# Patient Record
Sex: FEMALE | Race: WHITE | NOT HISPANIC OR LATINO | Employment: STUDENT | URBAN - METROPOLITAN AREA
[De-identification: names, ages, dates, MRNs, and addresses within clinical notes are randomized per-mention and may not be internally consistent; named-entity substitution may affect disease eponyms.]

---
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
## 2025-01-06 ENCOUNTER — MYC MEDICAL ADVICE (OUTPATIENT)
Dept: PEDIATRIC CARDIOLOGY | Facility: CLINIC | Age: 20
End: 2025-01-06
Payer: COMMERCIAL

## 2025-01-06 DIAGNOSIS — I47.10 SUPRAVENTRICULAR TACHYCARDIA: Primary | ICD-10-CM

## 2025-01-07 ENCOUNTER — ORDERS ONLY (AUTO-RELEASED) (OUTPATIENT)
Dept: PEDIATRIC CARDIOLOGY | Facility: CLINIC | Age: 20
End: 2025-01-07
Payer: COMMERCIAL

## 2025-01-07 DIAGNOSIS — I47.10 SUPRAVENTRICULAR TACHYCARDIA: ICD-10-CM

## 2025-01-30 ENCOUNTER — TELEPHONE (OUTPATIENT)
Dept: PEDIATRIC CARDIOLOGY | Facility: CLINIC | Age: 20
End: 2025-01-30
Payer: COMMERCIAL

## 2025-01-30 LAB — CV ZIO PRELIM RESULTS: NORMAL

## 2025-01-30 NOTE — TELEPHONE ENCOUNTER
Incoming call from [employer] to report abnormal Zio results:    Ref: [id number]    Call in regard to 11 day monitor dated from Jan 11-23rd    Finding for VTACH that occurred on Jan 21st at 7:15 am at a rate of 172 BPM, for 1.8 seconds.     Event on page 14, strip 8. Report to be uploaded in 20-30 minutes.     Will send high priority notification to provider per protocol.     Zully Hussein RN on 1/30/2025 at 2:12 PM

## 2025-05-08 DIAGNOSIS — I47.10 SUPRAVENTRICULAR TACHYCARDIA: Primary | ICD-10-CM

## 2025-05-14 ENCOUNTER — ANCILLARY PROCEDURE (OUTPATIENT)
Dept: CARDIOLOGY | Facility: CLINIC | Age: 20
End: 2025-05-14
Payer: COMMERCIAL

## 2025-05-14 ENCOUNTER — OFFICE VISIT (OUTPATIENT)
Dept: PEDIATRIC CARDIOLOGY | Facility: CLINIC | Age: 20
End: 2025-05-14
Payer: COMMERCIAL

## 2025-05-14 VITALS — SYSTOLIC BLOOD PRESSURE: 115 MMHG | DIASTOLIC BLOOD PRESSURE: 72 MMHG | HEART RATE: 67 BPM

## 2025-05-14 DIAGNOSIS — R06.09 DOE (DYSPNEA ON EXERTION): Primary | ICD-10-CM

## 2025-05-14 DIAGNOSIS — I47.10 SUPRAVENTRICULAR TACHYCARDIA: ICD-10-CM

## 2025-05-14 LAB
ATRIAL RATE - MUSE: 48 BPM
DIASTOLIC BLOOD PRESSURE - MUSE: NORMAL MMHG
INTERPRETATION ECG - MUSE: NORMAL
P AXIS - MUSE: 29 DEGREES
PR INTERVAL - MUSE: 126 MS
QRS DURATION - MUSE: 90 MS
QT - MUSE: 464 MS
QTC - MUSE: 414 MS
R AXIS - MUSE: 82 DEGREES
SYSTOLIC BLOOD PRESSURE - MUSE: NORMAL MMHG
T AXIS - MUSE: 45 DEGREES
VENTRICULAR RATE- MUSE: 48 BPM

## 2025-05-14 PROCEDURE — 3078F DIAST BP <80 MM HG: CPT | Performed by: PEDIATRICS

## 2025-05-14 PROCEDURE — 1126F AMNT PAIN NOTED NONE PRSNT: CPT | Performed by: PEDIATRICS

## 2025-05-14 PROCEDURE — 93306 TTE W/DOPPLER COMPLETE: CPT | Performed by: PEDIATRICS

## 2025-05-14 PROCEDURE — 3074F SYST BP LT 130 MM HG: CPT | Performed by: PEDIATRICS

## 2025-05-14 PROCEDURE — 99214 OFFICE O/P EST MOD 30 MIN: CPT | Mod: 25 | Performed by: PEDIATRICS

## 2025-05-14 RX ORDER — ALBUTEROL SULFATE 90 UG/1
2 INHALANT RESPIRATORY (INHALATION) EVERY 6 HOURS PRN
Qty: 18 G | Refills: 0 | Status: SHIPPED | OUTPATIENT
Start: 2025-05-14

## 2025-05-14 ASSESSMENT — PAIN SCALES - GENERAL: PAINLEVEL_OUTOF10: NO PAIN (0)

## 2025-05-14 NOTE — PATIENT INSTRUCTIONS
[facility]   Pediatric Specialty Clinic [place]      Pediatric Call Center Scheduling and Nurse Questions:  [phone]    After hours urgent matters that cannot wait until the next business day:  [phone].  Ask for the on-call pediatric doctor for the specialty you are calling for be paged.      Prescription Renewals:  Please call your pharmacy first.  Your pharmacy must fax requests to [phone].  Please allow 2-3 days for prescriptions to be authorized.    If your physician has ordered a CT or MRI, you may schedule this test by calling [facility] in [place] at [phone].        **If your child is having a sedated procedure, they will need a history and physical done at their Primary Care Provider within 30 days of the procedure.  If your child was seen by the ordering provider in our office within 30 days of the procedure, their visit summary will work for the H&P unless they inform you otherwise.  If you have any questions, please call the RN Care Coordinator.**

## 2025-05-14 NOTE — LETTER
5/14/2025      RE: Ksenia Clark  [address]  [facility] [address]     Dear Colleague,    Thank you for the opportunity to participate in the care of your patient, Ksenia Clark, at the [facility] P[facility]ATR[facility] CLIN[facility]. Please see a copy of my visit note below.    No notes on file    Please do not hesitate to contact me if you have any questions/concerns.     Sincerely,       Gerry Titus MD   HPI.    Physical Examination:  /72 (BP Location: Right arm, Patient Position: Sitting, Cuff Size: Adult Regular)   Pulse 67   GENERAL: Pleasant and conversant, non-distressed  SKIN: Clear, no rash or abnormal pigmentation  HEAD: NC/AT, nondysmorphic  NECK: Supple without lymphadenopathy or thyromegaly  LUNGS: CTAB, normal symmetric air entry, normal WOB, no rales/rhonchi/wheezes  HEART: Quiet precordium, RRR, normal S1/S2, no murmurs, no r/g  ABDOMEN: Soft, NT/ND, normoactive BS, no HSM  EXTREMITIES: W/WP, no c/c/e, pulses 2+ throughout without radio-femoral delay  GENITOURINARY: deferred        I reviewed and interpreted Ksenia's ECG from today, which showed normal sinus rhythm, normal axes and intervals, no preexcitation, normal ST-T waves, and normal voltages.   I reviewed her echo from today, which showed normal structure and function.    Assessment and Plan: Ksenia is a 20 year old female with supraventricular tachycardia status-post empiric modification of dual AV node physiology during EP study, with recurrence <1-year. Both episodes were easily broken with vagal maneuvers. We discussed options moving forward today, including ongoing monitoring without other intervention (given her episodes are easily broken and infrequent); daily preventative medication such as beta blockade; pill-in-the-pocket strategy (of little utility given that episodes end spontaneously before the medication would even begin to have an effect); or repeat EP study, either with Dr. Alamo or with a n adult electrophysiologist. She will consider options and get back to me. She will follow-up in 6months regardless with an ECG. She has no activity restrictions. No antibiotic prophylaxis required for invasive procedures..    Thank you for the opportunity to follow Ksenia with you. Please don't hesitate to contact me with questions or concerns.    Gerry Titus MD  Pediatric Cardiology  [facility]  [address] [address]  Phone [phone]  Fax [phone]    I spent a total of 30 minutes reviewing records and results, obtaining direct clinical information, counseling, and coordinating care for Ksenia Clark during today's office visit.       Please do not hesitate to contact me if you have any questions/concerns.     Sincerely,       Gerry Titus MD

## 2025-05-14 NOTE — NURSING NOTE
"[facility] [[id number]]  Chief Complaint   Patient presents with    Follow Up     Paroxysmal supraventricular tachycardia     Initial /72 (BP Location: Right arm, Patient Position: Sitting, Cuff Size: Adult Regular)   Pulse 67  Estimated body mass index is 26.11 kg/m  as calculated from the following:    Height as of 7/26/24: 5' 3.82\" (162.1 cm).    Weight as of 7/26/24: 151 lb 3.8 oz (68.6 kg).  Medication Reconciliation: complete    Does the patient need any medication refills today? No    Does the patient/parent have MyChart set up? Yes   Proxy access needed? No    Is the patient 18 or turning 18 in the next 2 months? No   If yes, make sure they have a Consent To Communicate on file                "

## 2025-05-27 NOTE — PROGRESS NOTES
Pediatric Cardiology Visit    Patient:  Ksenia Clark MRN:  [id number]   YOB: 2005 Age:  20 year old   Date of Visit:  5/14/2025 PCP:  Anisha Denny MD     Dear Anisha Cook MD:    I had the pleasure of seeing Ksenia Clark at the [facility] in [place] on 5/14/2025 in ongoing consultation for supraventricular tachcyardia. Today's history obtained from Ksenia. As you know, she is a 20 year old female with history of a resolved muscular ventricular septal defect, and more recently paroxysmal supraventricular tachycardia now status-post EP-study with Dr. Alamo at [facility] in 2024 that unfortunately did not elicit any tachycardia; she had empiric modification of dual AV node physiology. Since that procedure, she had been symptom free until recently on 4/17 while during weightlifting she had a recurrence; Apple watch said . This episode lasted around 10 minutes before breaking. She had another a week later while lying in bed, again lasting several minutes before she successfully vagal-maneuvered to break it. Apart from concerns for exercise associated asthma, no other medical concerns or symptoms of concern.    Past medical history:  As above. I reviewed Ksenia Clark's medical records.    She has a current medication list which includes the following prescription(s): albuterol and norethindrone-ethinyl estradiol. She has no known allergies.    Family and Social History:  unchanged    The Review of Systems is negative other than noted in the HPI.    Physical Examination:  /72 (BP Location: Right arm, Patient Position: Sitting, Cuff Size: Adult Regular)   Pulse 67   GENERAL: Pleasant and conversant, non-distressed  SKIN: Clear, no rash or abnormal pigmentation  HEAD: NC/AT, nondysmorphic  NECK: Supple without lymphadenopathy or thyromegaly  LUNGS: CTAB, normal symmetric air entry, normal WOB, no  rales/rhonchi/wheezes  HEART: Quiet precordium, RRR, normal S1/S2, no murmurs, no r/g  ABDOMEN: Soft, NT/ND, normoactive BS, no HSM  EXTREMITIES: W/WP, no c/c/e, pulses 2+ throughout without radio-femoral delay  GENITOURINARY: deferred        I reviewed and interpreted Ksenia's ECG from today, which showed normal sinus rhythm, normal axes and intervals, no preexcitation, normal ST-T waves, and normal voltages.   I reviewed her echo from today, which showed normal structure and function.    Assessment and Plan: Ksenia is a 20 year old female with supraventricular tachycardia status-post empiric modification of dual AV node physiology during EP study, with recurrence <1-year. Both episodes were easily broken with vagal maneuvers. We discussed options moving forward today, including ongoing monitoring without other intervention (given her episodes are easily broken and infrequent); daily preventative medication such as beta blockade; pill-in-the-pocket strategy (of little utility given that episodes end spontaneously before the medication would even begin to have an effect); or repeat EP study, either with Dr. Alamo or with a n adult electrophysiologist. She will consider options and get back to me. She will follow-up in 6months regardless with an ECG. She has no activity restrictions. No antibiotic prophylaxis required for invasive procedures..    Thank you for the opportunity to follow Ksenia with you. Please don't hesitate to contact me with questions or concerns.    Gerry Titus MD  Pediatric Cardiology  [facility] [address] [address]  Phone [phone]  Fax [phone]    I spent a total of 30 minutes reviewing records and results, obtaining direct clinical information, counseling, and coordinating care for Ksenia Clark during today's office visit.

## 2025-06-17 ENCOUNTER — TELEPHONE (OUTPATIENT)
Dept: PEDIATRIC CARDIOLOGY | Facility: CLINIC | Age: 20
End: 2025-06-17
Payer: COMMERCIAL

## 2025-06-17 NOTE — TELEPHONE ENCOUNTER
Per chart review, on 6/13, it was noted that Dr. Titus stated that he had provided a courtesy refill on albuterol back in May, as the patient was told to follow up with PCP if looking for further refills. [employer] message was sent on 6/13 but has not been read by the patient. RNCC will call patient to inform her of the plan.

## 2025-06-17 NOTE — TELEPHONE ENCOUNTER
[facility] Call Center    Phone Message    May a detailed message be left on voicemail: yes     Reason for Call: Medication Refill Request    Has the patient contacted the pharmacy for the refill? Yes, pharmacy states they have not received a response to the request they sent last week   Name of medication being requested: albuterol (PROAIR HFA/PROVENTIL HFA/VENTOLIN HFA) 108 (90 Base) MCG/ACT inhaler  Provider who prescribed the medication: Dr. Titus  Pharmacy:   [id number] [place], [place] - [address] AT [place] [address] & [facility]           Action Taken: Message routed to:  Other: Peds Cardio    Travel Screening: Not Applicable     Date of Service:

## 2025-06-17 NOTE — TELEPHONE ENCOUNTER
RNCC called patient. RNCC received an unidentified voicemail, however was noted from call center that it is okay to leave a detailed message.     RNCC noted to Ksenia that Dr. Titus had provided a courtesy refill on albuterol back in May, and he noted that she was told to follow up with PCP if looking for further refills. RNCC left nurse triage line phone number for the patient to call back, if she had any further questions or concerns.

## (undated) DEVICE — 5 FR X 110CM, 2-5-2MM SPACING, 1MM TIP, MEDIUM CURVE, INQUIRY DECAPOLAR STEERABLE EP CATH

## (undated) DEVICE — CATH THERMOCOOL SMARTTOUCH SF DF CURVE

## (undated) DEVICE — INTRO CATH 12CM 8.5FR FST-CATH

## (undated) DEVICE — DEFIB PADPRO CONMED ADULT/CHILD 2001Z-C

## (undated) DEVICE — GLOVE BIOGEL PI MICRO INDICATOR UNDERGLOVE SZ 8.0 48980

## (undated) DEVICE — Device

## (undated) DEVICE — CATH UMBILICAL COAX CBL 203CXC

## (undated) DEVICE — GLOVE BIOGEL PI SZ 8.0 40880

## (undated) DEVICE — PACK PEDS LEFT HEART CUSTOM SCV15OHRMH

## (undated) DEVICE — INTRO CATH 12CM 5FR FST-CATH TRNSEPT HMSTS CATH LOK

## (undated) DEVICE — TUBE SET SMARKABLATE IRRIGATION

## (undated) DEVICE — PATCH CARTO 3 EXTERNAL REFERENCE 3D MAPPING CREFP6

## (undated) DEVICE — INTRODUCER SHEATH FAST-CATH CATH-LOCK 6FRX12CM 406701

## (undated) RX ORDER — ADENOSINE 3 MG/ML
INJECTION, SOLUTION INTRAVENOUS
Status: DISPENSED
Start: 2024-07-01

## (undated) RX ORDER — ACETAMINOPHEN 325 MG/1
TABLET ORAL
Status: DISPENSED
Start: 2024-07-01

## (undated) RX ORDER — BUPIVACAINE HYDROCHLORIDE 2.5 MG/ML
INJECTION, SOLUTION EPIDURAL; INFILTRATION; INTRACAUDAL
Status: DISPENSED
Start: 2024-07-01

## (undated) RX ORDER — ONDANSETRON 2 MG/ML
INJECTION INTRAMUSCULAR; INTRAVENOUS
Status: DISPENSED
Start: 2024-07-01

## (undated) RX ORDER — HEPARIN SODIUM 1000 [USP'U]/ML
INJECTION, SOLUTION INTRAVENOUS; SUBCUTANEOUS
Status: DISPENSED
Start: 2024-07-01

## (undated) RX ORDER — PROPOFOL 10 MG/ML
INJECTION, EMULSION INTRAVENOUS
Status: DISPENSED
Start: 2024-07-01

## (undated) RX ORDER — FENTANYL CITRATE 50 UG/ML
INJECTION, SOLUTION INTRAMUSCULAR; INTRAVENOUS
Status: DISPENSED
Start: 2024-07-01

## (undated) RX ORDER — DEXAMETHASONE SODIUM PHOSPHATE 4 MG/ML
INJECTION, SOLUTION INTRA-ARTICULAR; INTRALESIONAL; INTRAMUSCULAR; INTRAVENOUS; SOFT TISSUE
Status: DISPENSED
Start: 2024-07-01